# Patient Record
Sex: MALE | Race: WHITE | URBAN - METROPOLITAN AREA
[De-identification: names, ages, dates, MRNs, and addresses within clinical notes are randomized per-mention and may not be internally consistent; named-entity substitution may affect disease eponyms.]

---

## 2024-08-12 ENCOUNTER — OFFICE VISIT (OUTPATIENT)
Dept: FAMILY MEDICINE CLINIC | Facility: CLINIC | Age: 34
End: 2024-08-12
Payer: COMMERCIAL

## 2024-08-12 VITALS
SYSTOLIC BLOOD PRESSURE: 122 MMHG | TEMPERATURE: 98 F | HEART RATE: 91 BPM | DIASTOLIC BLOOD PRESSURE: 88 MMHG | OXYGEN SATURATION: 99 % | RESPIRATION RATE: 16 BRPM | BODY MASS INDEX: 28.35 KG/M2 | WEIGHT: 245 LBS | HEIGHT: 78 IN

## 2024-08-12 DIAGNOSIS — J02.9 SORE THROAT: Primary | ICD-10-CM

## 2024-08-12 DIAGNOSIS — R09.82 PND (POST-NASAL DRIP): ICD-10-CM

## 2024-08-12 PROBLEM — F90.0 ATTENTION DEFICIT HYPERACTIVITY DISORDER, PREDOMINANTLY INATTENTIVE TYPE: Status: ACTIVE | Noted: 2017-09-11

## 2024-08-12 PROBLEM — L65.9 LOSS OF HAIR: Status: ACTIVE | Noted: 2018-07-31

## 2024-08-12 PROBLEM — N52.9 PRIMARY ERECTILE DYSFUNCTION: Status: ACTIVE | Noted: 2023-02-08

## 2024-08-12 LAB — CONTROL LINE PRESENT WITH A CLEAR BACKGROUND (YES/NO): YES YES/NO

## 2024-08-12 RX ORDER — TRETINOIN 0.05 G/100G
1 GEL TOPICAL NIGHTLY
COMMUNITY
Start: 2023-02-10

## 2024-08-12 RX ORDER — FINASTERIDE 5 MG/1
1 TABLET, FILM COATED ORAL DAILY
COMMUNITY
Start: 2023-08-23

## 2024-08-12 RX ORDER — SILDENAFIL 25 MG/1
TABLET, FILM COATED ORAL
COMMUNITY
Start: 2023-02-10

## 2024-08-12 RX ORDER — DEXTROAMPHETAMINE SACCHARATE, AMPHETAMINE ASPARTATE, DEXTROAMPHETAMINE SULFATE AND AMPHETAMINE SULFATE 7.5; 7.5; 7.5; 7.5 MG/1; MG/1; MG/1; MG/1
30 TABLET ORAL DAILY
COMMUNITY
Start: 2024-07-16 | End: 2024-08-15

## 2024-08-12 RX ORDER — DEXTROAMPHETAMINE SACCHARATE, AMPHETAMINE ASPARTATE, DEXTROAMPHETAMINE SULFATE AND AMPHETAMINE SULFATE 7.5; 7.5; 7.5; 7.5 MG/1; MG/1; MG/1; MG/1
TABLET ORAL
COMMUNITY
Start: 2023-10-23

## 2024-08-12 NOTE — PROGRESS NOTES
Subjective:   Patient ID: Thomas Elliott is a 33 year old male.    Patient is a 33 year old male who presents today with complaints of sore throat and pnd x last night. Denies fevers, body aches, chills, runny nose, nasal congestion, cough, ear pain, n/v/d, abdominal pain or headaches. Tolerating PO well at home. Attempted treatment prior to arrival = Dayquil. Wife with flu like symptoms.         History/Other:   Review of Systems   Constitutional:  Negative for appetite change, chills and fever.   HENT:  Positive for postnasal drip and sore throat. Negative for congestion, ear pain and rhinorrhea.    Respiratory:  Negative for cough.    Gastrointestinal:  Negative for abdominal pain, diarrhea, nausea and vomiting.   Musculoskeletal:  Negative for myalgias.   Neurological:  Negative for headaches.     Current Outpatient Medications   Medication Sig Dispense Refill    amphetamine-dextroamphetamine 30 MG Oral Tab take 1 tab orally once daily      amphetamine-dextroamphetamine 30 MG Oral Tab Take 1 tablet (30 mg total) by mouth daily.      finasteride 5 MG Oral Tab Take 1 tablet (5 mg total) by mouth daily.      Sildenafil Citrate 25 MG Oral Tab TAKE ONE TABLET 30 MINUTES PRIOR TO SEXUAL ACTIVITY      Tretinoin 0.05 % External Gel Apply 1 Application topically nightly.       Allergies:  Allergies   Allergen Reactions    Amoxicillin RASH     /88   Pulse 91   Temp 97.5 °F (36.4 °C) (Oral)   Resp 16   Ht 6' 8\" (2.032 m)   Wt 245 lb (111.1 kg)   SpO2 99%   BMI 26.91 kg/m²     Objective:   Physical Exam  Vitals reviewed.   Constitutional:       General: He is awake. He is not in acute distress.     Appearance: Normal appearance. He is well-developed and well-groomed. He is not ill-appearing, toxic-appearing or diaphoretic.   HENT:      Head: Normocephalic and atraumatic.      Right Ear: Tympanic membrane, ear canal and external ear normal.      Left Ear: Tympanic membrane, ear canal and external ear normal.       Nose: Nose normal.      Mouth/Throat:      Lips: Pink.      Mouth: Mucous membranes are moist. No oral lesions.      Pharynx: Oropharynx is clear. Uvula midline. Posterior oropharyngeal erythema present.      Comments: + cobblestoning  Cardiovascular:      Rate and Rhythm: Normal rate and regular rhythm.   Pulmonary:      Effort: Pulmonary effort is normal. No respiratory distress.      Breath sounds: Normal breath sounds and air entry. No decreased breath sounds, wheezing, rhonchi or rales.   Lymphadenopathy:      Head:      Right side of head: No tonsillar adenopathy.      Left side of head: No tonsillar adenopathy.      Cervical: No cervical adenopathy.   Skin:     General: Skin is warm and dry.   Neurological:      Mental Status: He is alert and oriented to person, place, and time.   Psychiatric:         Behavior: Behavior is cooperative.         Assessment & Plan:   1. Sore throat    2. PND (post-nasal drip)        Orders Placed This Encounter   Procedures    Rapid Strep     Results for orders placed or performed in visit on 08/12/24   Rapid Strep    Collection Time: 08/12/24  6:31 PM   Result Value Ref Range    Strep Grp A Screen Neg Negative    Control Line Present with a clear background (yes/no) Yes Yes/No    Kit Lot # [id number] Numeric    Kit Expiration Date 5/21/25 Date         Meds This Visit:  Requested Prescriptions      No prescriptions requested or ordered in this encounter     Reviewed POC test results with patient.  Reassuring physical exam findings. Vitals WNL. No sign of bacterial etiology, RDS or dehydration at this time.  Supportive care and return to care measures reviewed.  Patient v/u and is comfortable with this plan.    Patient Instructions   1. Gargle throat with 1 tsp (5 g) of salt dissolved in 8 fl oz (240 mL) of warm water.  You may also drink warm broth/soup, or tea with honey  2. Drink plenty of fluids and get plenty of rest.   3. You may use throat lozenges, acetaminophen, or  ibuprofen for pain and fever.    4. Using an OTC antihistamine such as once daily Zyrtec or Claritin (choose one) may help to dry any postnasal drip and decrease irritation to your throat. Take as directed.  5. Follow up with primary care physician in 1-2 weeks or sooner if needed.  6. Seek medical attention sooner for worsening of symptoms despite treatment efforts, or the emergency room for the following non inclusive list of symptoms: uncontrolled fever/pain, inability to keep fluids down, shortness of breath or respiratory distress.